# Patient Record
Sex: FEMALE | Race: BLACK OR AFRICAN AMERICAN | NOT HISPANIC OR LATINO
[De-identification: names, ages, dates, MRNs, and addresses within clinical notes are randomized per-mention and may not be internally consistent; named-entity substitution may affect disease eponyms.]

---

## 2020-04-26 ENCOUNTER — MESSAGE (OUTPATIENT)
Age: 55
End: 2020-04-26

## 2020-05-11 LAB
SARS-COV-2 IGG SERPL IA-ACNC: 5.6 INDEX
SARS-COV-2 IGG SERPL QL IA: POSITIVE

## 2020-10-19 ENCOUNTER — APPOINTMENT (OUTPATIENT)
Dept: MAMMOGRAPHY | Facility: IMAGING CENTER | Age: 55
End: 2020-10-19

## 2020-10-19 PROBLEM — Z00.00 ENCOUNTER FOR PREVENTIVE HEALTH EXAMINATION: Status: ACTIVE | Noted: 2020-10-19

## 2021-10-05 ENCOUNTER — EMERGENCY (EMERGENCY)
Facility: HOSPITAL | Age: 56
LOS: 0 days | Discharge: HOME | End: 2021-10-05
Attending: STUDENT IN AN ORGANIZED HEALTH CARE EDUCATION/TRAINING PROGRAM
Payer: MEDICARE

## 2021-10-05 VITALS
HEIGHT: 59 IN | RESPIRATION RATE: 20 BRPM | OXYGEN SATURATION: 100 % | TEMPERATURE: 98 F | DIASTOLIC BLOOD PRESSURE: 80 MMHG | WEIGHT: 145.06 LBS | HEART RATE: 55 BPM | SYSTOLIC BLOOD PRESSURE: 160 MMHG

## 2021-10-05 DIAGNOSIS — Z88.8 ALLERGY STATUS TO OTHER DRUGS, MEDICAMENTS AND BIOLOGICAL SUBSTANCES: ICD-10-CM

## 2021-10-05 DIAGNOSIS — M79.675 PAIN IN LEFT TOE(S): ICD-10-CM

## 2021-10-05 DIAGNOSIS — Z91.013 ALLERGY TO SEAFOOD: ICD-10-CM
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
## 2021-10-05 PROCEDURE — 73620 X-RAY EXAM OF FOOT: CPT | Mod: 26,LT

## 2021-10-05 PROCEDURE — 99283 EMERGENCY DEPT VISIT LOW MDM: CPT

## 2021-10-05 NOTE — ED ADULT NURSE NOTE - OBJECTIVE STATEMENT
pt is a 56 year old female, AAX4, stubbed her third left toe 2 weeks ago, pain has not resolved, noticed left ankle swelling last night. denies any PMH

## 2021-10-05 NOTE — ED PROVIDER NOTE - MUSCULOSKELETAL, MLM
Spine appears normal, range of motion is not limited, mild TTP to base of left third toe without any appreciated swelling or deformity.

## 2021-10-05 NOTE — ED PROVIDER NOTE - OBJECTIVE STATEMENT
57 yo F w/no pertinent PMH presents to the ED after x2 weeks ago while walking pt stubbed her left third toe and has had left foot pain since then. Pt able to ambulate although with mild discomfort. Denies taking any medication for her pain.

## 2021-10-05 NOTE — ED PROVIDER NOTE - PATIENT PORTAL LINK FT
You can access the FollowMyHealth Patient Portal offered by  by registering at the following website: [email or web address]. By joining [facility]’s FollowMyHealth portal, you will also be able to view your health information using other applications (apps) compatible with our system.

## 2021-10-05 NOTE — ED PROVIDER NOTE - CARE PROVIDER_API CALL
Yoly Lares (DPM)  Podiatric Medicine and Surgery  [address] [place] [address]  Phone: [phone]  Fax: [phone]  Follow Up Time:

## 2022-07-17 NOTE — ED PROVIDER NOTE - IV ALTEPLASE EXCL ABS HIDDEN
Received report and assumed care of patient. Patient lying in bed with even, unlabored breathing and eyes closed. No apparent needs at this time.     0815 - Assessment completed. Patient is A&Ox4 with no reported pain. Vital signs are stable on room air. Plan of care discussed and all questions answered. All needs met.    show

## 2022-09-25 ENCOUNTER — NON-APPOINTMENT (OUTPATIENT)
Age: 57
End: 2022-09-25

## 2022-10-07 ENCOUNTER — NON-APPOINTMENT (OUTPATIENT)
Age: 57
End: 2022-10-07

## 2022-10-11 ENCOUNTER — NON-APPOINTMENT (OUTPATIENT)
Age: 57
End: 2022-10-11

## 2022-10-14 ENCOUNTER — APPOINTMENT (OUTPATIENT)
Dept: PULMONOLOGY | Facility: CLINIC | Age: 57
End: 2022-10-14

## 2022-10-14 VITALS
WEIGHT: 150 LBS | HEART RATE: 88 BPM | OXYGEN SATURATION: 98 % | HEIGHT: 60 IN | BODY MASS INDEX: 29.45 KG/M2 | TEMPERATURE: 98.4 F

## 2022-10-14 DIAGNOSIS — Z86.16 PERSONAL HISTORY OF COVID-19: ICD-10-CM

## 2022-10-14 DIAGNOSIS — J45.909 UNSPECIFIED ASTHMA, UNCOMPLICATED: ICD-10-CM

## 2022-10-14 PROCEDURE — 99204 OFFICE O/P NEW MOD 45 MIN: CPT

## 2022-10-14 RX ORDER — PREDNISONE 20 MG/1
20 TABLET ORAL DAILY
Qty: 10 | Refills: 0 | Status: ACTIVE | OUTPATIENT
Start: 2022-10-14

## 2022-10-14 NOTE — HISTORY OF PRESENT ILLNESS
[Never] : never [TextBox_4] : She is a 57-year-old woman with history of asthma as a child.  She had COVID in April 2020 and again in September 2022.  She presented with a cough.\par \par She was seen in urgent care and was given doxycycline and Medrol for the cough.  The cough persisted and she was subsequently given prednisone 50 mg for 3 days.  The cough has improved somewhat.  The cough is nonproductive.  No shortness of breath.  No constitutional symptoms reported. [Daytime Somnolence] : denies daytime somnolence

## 2022-10-14 NOTE — PHYSICAL EXAM
[No Acute Distress] : no acute distress [No Neck Mass] : no neck mass [Normal S1, S2] : normal s1, s2 [Clear to Auscultation Bilaterally] : clear to auscultation bilaterally [Benign] : benign [Normal Gait] : normal gait [No Cyanosis] : no cyanosis [No Edema] : no edema [Normal Turgor] : normal turgor [Oriented x3] : oriented x3

## 2022-10-14 NOTE — REVIEW OF SYSTEMS
[Cough] : cough [Fever] : no fever [Postnasal Drip] : no postnasal drip [Hemoptysis] : no hemoptysis [Sputum] : no sputum [Chest Discomfort] : no chest discomfort [GERD] : no gerd [Myalgias] : no myalgias [Rash] : no rash [History of Iron Deficiency] : no history of iron deficiency [Depression] : no depression [Diabetes] : no diabetes [Thyroid Problem] : no thyroid problem

## 2022-10-14 NOTE — DISCUSSION/SUMMARY
[FreeTextEntry1] : She is a 57 year-old woman with history of asthma as a child.  She had COVID in April 2020 and again in September 2022.  She presented with a cough.\par \par She was seen in urgent care on 3 occasions.  She did not take Paxlovid. Ultimately she was given doxycycline and Medrol for the cough.  The cough persisted and she was subsequently given prednisone 50 mg for 3 days.  The cough has improved somewhat.  The cough is nonproductive.  No shortness of breath.  No constitutional symptoms reported.\par \par The cough appears to be due to reactive airways disease as a consequence of her recent COVID infection.  She will be placed on Breo, or similar.  Prednisone 20 mg for 10 days given.  She can complete montelukast and the prednisone that was previously prescribed.  Follow-up in 1 month with PFT.\par \par

## 2022-11-11 ENCOUNTER — RX RENEWAL (OUTPATIENT)
Age: 57
End: 2022-11-11

## 2022-11-21 ENCOUNTER — APPOINTMENT (OUTPATIENT)
Dept: PULMONOLOGY | Facility: CLINIC | Age: 57
End: 2022-11-21

## 2022-11-21 VITALS
HEART RATE: 64 BPM | DIASTOLIC BLOOD PRESSURE: 88 MMHG | SYSTOLIC BLOOD PRESSURE: 136 MMHG | TEMPERATURE: 98 F | OXYGEN SATURATION: 98 % | BODY MASS INDEX: 29.3 KG/M2 | WEIGHT: 150 LBS

## 2022-11-21 DIAGNOSIS — R05.9 COUGH, UNSPECIFIED: ICD-10-CM

## 2022-11-21 PROCEDURE — 99213 OFFICE O/P EST LOW 20 MIN: CPT

## 2022-11-21 RX ORDER — MONTELUKAST 10 MG/1
10 TABLET, FILM COATED ORAL
Qty: 90 | Refills: 1 | Status: ACTIVE | COMMUNITY
Start: 2022-11-21 | End: 1900-01-01

## 2022-11-21 NOTE — REVIEW OF SYSTEMS
[Cough] : cough [Fever] : no fever [Fatigue] : no fatigue [Nasal Congestion] : no nasal congestion [Sputum] : no sputum [Chest Discomfort] : no chest discomfort [GERD] : no gerd [Myalgias] : no myalgias [Rash] : no rash [History of Iron Deficiency] : no history of iron deficiency [Depression] : no depression [Diabetes] : no diabetes [Thyroid Problem] : no thyroid problem

## 2022-11-21 NOTE — DISCUSSION/SUMMARY
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  She had COVID in April 2020 and again in September 2022.  She presented on 10/14/22 with a cough.\par \par The cough improved with Breo which she has stopped. Will resume montelukast. To continue with albuterol as needed. May need to use Breo intermittently. \par \par Will see again as needed.

## 2022-11-21 NOTE — HISTORY OF PRESENT ILLNESS
[Never] : never [TextBox_4] : She is a 57 year-old woman with history of asthma as a child.  She had COVID in April 2020 and again in September 2022.  She presented with a cough.\par \par She was seen in urgent care and was given doxycycline and Medrol for the cough.  The cough persisted and she was subsequently given prednisone 50 mg for 3 days.  The cough has improved somewhat.  The cough is nonproductive.  No shortness of breath.  No constitutional symptoms reported.\par \par The patient came for a scheduled follow up visit today. Her cough has improved. Completed Breo. Uses albuterol about once a day. \par  [Daytime Somnolence] : denies daytime somnolence

## 2022-11-21 NOTE — PHYSICAL EXAM
[No Acute Distress] : no acute distress [No Neck Mass] : no neck mass [Normal S1, S2] : normal s1, s2 [Clear to Auscultation Bilaterally] : clear to auscultation bilaterally [Benign] : benign [Normal Gait] : normal gait [No Cyanosis] : no cyanosis [No Edema] : no edema [Normal Turgor] : normal turgor [Oriented x3] : oriented x3 [Normal Rate/Rhythm] : normal rate/rhythm [No Resp Distress] : no resp distress [No Focal Deficits] : no focal deficits [Normal Affect] : normal affect

## 2022-12-15 ENCOUNTER — NON-APPOINTMENT (OUTPATIENT)
Age: 57
End: 2022-12-15

## 2022-12-19 ENCOUNTER — RX RENEWAL (OUTPATIENT)
Age: 57
End: 2022-12-19

## 2022-12-19 RX ORDER — FLUTICASONE FUROATE AND VILANTEROL TRIFENATATE 100; 25 UG/1; UG/1
100-25 POWDER RESPIRATORY (INHALATION)
Qty: 60 | Refills: 1 | Status: ACTIVE | COMMUNITY
Start: 2022-10-14 | End: 1900-01-01

## 2023-05-18 ENCOUNTER — RX RENEWAL (OUTPATIENT)
Age: 58
End: 2023-05-18

## 2023-07-10 NOTE — ED PROVIDER NOTE - CLINICAL SUMMARY MEDICAL DECISION MAKING FREE TEXT BOX
show Will obtain x-ray of affected area to look for traumatic injury and likely placed in hard sole shoe for podiatry follow up.

## 2023-07-17 ENCOUNTER — EMERGENCY (EMERGENCY)
Facility: HOSPITAL | Age: 58
LOS: 0 days | Discharge: HOME | End: 2023-07-17
Attending: STUDENT IN AN ORGANIZED HEALTH CARE EDUCATION/TRAINING PROGRAM
Payer: OTHER MISCELLANEOUS

## 2023-07-17 VITALS
DIASTOLIC BLOOD PRESSURE: 80 MMHG | HEIGHT: 59 IN | OXYGEN SATURATION: 98 % | RESPIRATION RATE: 19 BRPM | SYSTOLIC BLOOD PRESSURE: 158 MMHG | HEART RATE: 50 BPM | TEMPERATURE: 98 F | WEIGHT: 145.06 LBS
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
## 2023-07-17 DIAGNOSIS — M25.561 PAIN IN RIGHT KNEE: ICD-10-CM

## 2023-07-17 DIAGNOSIS — M25.512 PAIN IN LEFT SHOULDER: ICD-10-CM

## 2023-07-17 DIAGNOSIS — S80.01XA CONTUSION OF RIGHT KNEE, INITIAL ENCOUNTER: ICD-10-CM

## 2023-07-17 DIAGNOSIS — Z98.891 HISTORY OF UTERINE SCAR FROM PREVIOUS SURGERY: Chronic | ICD-10-CM

## 2023-07-17 DIAGNOSIS — Z86.2 PERSONAL HISTORY OF DISEASES OF THE BLOOD AND BLOOD-FORMING ORGANS AND CERTAIN DISORDERS INVOLVING THE IMMUNE MECHANISM: ICD-10-CM
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
## 2023-07-17 DIAGNOSIS — Z88.8 ALLERGY STATUS TO OTHER DRUGS, MEDICAMENTS AND BIOLOGICAL SUBSTANCES: ICD-10-CM

## 2023-07-17 DIAGNOSIS — Z91.013 ALLERGY TO SEAFOOD: ICD-10-CM

## 2023-07-17 PROCEDURE — 73030 X-RAY EXAM OF SHOULDER: CPT | Mod: 26,LT

## 2023-07-17 PROCEDURE — 99284 EMERGENCY DEPT VISIT MOD MDM: CPT

## 2023-07-17 RX ORDER — IBUPROFEN 200 MG
600 TABLET ORAL ONCE
Refills: 0 | Status: COMPLETED | OUTPATIENT
Start: 2023-07-17 | End: 2023-07-17

## 2023-07-17 RX ADMIN — Medication 600 MILLIGRAM(S): at 14:23

## 2023-07-17 NOTE — ED PROVIDER NOTE - PATIENT PORTAL LINK FT
You can access the FollowMyHealth Patient Portal offered by [facility] by registering at the following website: [email or web address]. By joining [facility] [employer]’s FollowMyHealth portal, you will also be able to view your health information using other applications (apps) compatible with our system.

## 2023-07-17 NOTE — ED ADULT NURSE NOTE - NSFALLUNIVINTERV_ED_ALL_ED
Bed/Stretcher in lowest position, wheels locked, appropriate side rails in place/Call bell, personal items and telephone in reach/Instruct patient to call for assistance before getting out of bed/chair/stretcher/Non-slip footwear applied when patient is off stretcher/[place] to call system/Physically safe environment - no spills, clutter or unnecessary equipment/Purposeful proactive rounding/Room/bathroom lighting operational, light cord in reach

## 2023-07-17 NOTE — ED PROVIDER NOTE - CLINICAL SUMMARY MEDICAL DECISION MAKING FREE TEXT BOX
pt w h/o bradycardia and anemia presents today s/p mechanical fall at work, pt c/o left shoulder pain and right knee contusion, denies head injury or LOC, on exam pt has no mildine spinal tenderness throughout her back, able to range of left shoulder with some tenderness, pt is able to bear weight on both legs while ambulating, will obtain xray, motrin for pain, reassess and dispo

## 2023-07-17 NOTE — ED ADULT TRIAGE NOTE - CHIEF COMPLAINT QUOTE
patient c/o of L shoulder pain , denied chest pain denied difficulty breathing , patient  had a fall at work , denied hitting head no LOC, patient  HR is 50  " my HR is always 50 " as per patient

## 2023-07-17 NOTE — ED PROVIDER NOTE - OBJECTIVE STATEMENT
58 year old female employee (from the [place]) with h/o bradycardia and anemia presents today s/p mechanical fall while at work, pt states the fall occurred fast but felt he sneaker got stuck causing her to fall onto the floor onto her left shoulder which she states was stretched out under her, pt denies head injury or LOC 58 year old female employee (from the [place]) with h/o bradycardia and anemia presents today s/p mechanical fall while at work, pt states the fall occurred fast but felt he sneaker got stuck causing her to fall onto the floor onto her left shoulder which she states was stretched out under her, pt denies head injury or LOC, also reports mild soreness of her right knee, pt was able to ambulate and bear weight

## 2024-04-16 ENCOUNTER — NON-APPOINTMENT (OUTPATIENT)
Age: 59
End: 2024-04-16

## 2024-12-03 ENCOUNTER — NON-APPOINTMENT (OUTPATIENT)
Age: 59
End: 2024-12-03

## 2025-01-13 NOTE — ED PROVIDER NOTE - VASCULAR COMPROMISE
[FreeTextEntry1] : 8/2022: LDL 86  Tg 78 glucose 87 crea 0.91   AST 31  ALT 64  A1c 5.4% TSH 2.88  Ft4 1.5  hb 14.8  11/2022: dex suppression test : dexamethasone level 255  ACTH <1.5 am cortisol 0.3 IGF1 153    2/20/23:  A1c 5.7%   ACTH 41.6 prolactin 11 am cortisol 10.3  DHEAS 356  LDL 86  Tg 43   Na 142  K 4.5   8/2023: A1c 5.7% TSH 2.91  ft4 1.6 LDL 67  Tg 47    6/2024: A1c 5.5% TSH 2.49 ft4 1.5 LDL 81 Tg 7125 vit D 30  b12 356   1/2025: A1c 5.8% glucose 87 crea 0.81    AST 22  ALT 41  TSH 2.58  FT4 1.3  LDL 76  tg 47 
no vascular compromise